# Patient Record
Sex: MALE | Race: WHITE | Employment: FULL TIME | URBAN - METROPOLITAN AREA
[De-identification: names, ages, dates, MRNs, and addresses within clinical notes are randomized per-mention and may not be internally consistent; named-entity substitution may affect disease eponyms.]

---

## 2020-06-29 DIAGNOSIS — Z11.59 ENCOUNTER FOR SCREENING FOR OTHER VIRAL DISEASES: Primary | ICD-10-CM

## 2020-07-08 DIAGNOSIS — Z11.59 ENCOUNTER FOR SCREENING FOR OTHER VIRAL DISEASES: ICD-10-CM

## 2020-07-08 PROCEDURE — U0003 INFECTIOUS AGENT DETECTION BY NUCLEIC ACID (DNA OR RNA); SEVERE ACUTE RESPIRATORY SYNDROME CORONAVIRUS 2 (SARS-COV-2) (CORONAVIRUS DISEASE [COVID-19]), AMPLIFIED PROBE TECHNIQUE, MAKING USE OF HIGH THROUGHPUT TECHNOLOGIES AS DESCRIBED BY CMS-2020-01-R: HCPCS | Performed by: ORTHOPAEDIC SURGERY

## 2020-07-08 PROCEDURE — 99207 ZZC NO BILLABLE SERVICE THIS VISIT: CPT

## 2020-07-09 ENCOUNTER — ANESTHESIA EVENT (OUTPATIENT)
Dept: SURGERY | Facility: CLINIC | Age: 35
End: 2020-07-09
Payer: COMMERCIAL

## 2020-07-09 LAB
SARS-COV-2 RNA SPEC QL NAA+PROBE: NOT DETECTED
SPECIMEN SOURCE: NORMAL

## 2020-07-09 RX ORDER — IBUPROFEN 200 MG
CAPSULE ORAL DAILY
COMMUNITY
End: 2020-07-09

## 2020-07-09 RX ORDER — FLUOXETINE 10 MG/1
10 TABLET, FILM COATED ORAL DAILY
COMMUNITY
End: 2020-07-09

## 2020-07-09 RX ORDER — FAMOTIDINE 20 MG/1
20 TABLET, FILM COATED ORAL AT BEDTIME
COMMUNITY
End: 2020-07-09

## 2020-07-10 ENCOUNTER — ANESTHESIA (OUTPATIENT)
Dept: SURGERY | Facility: CLINIC | Age: 35
End: 2020-07-10
Payer: COMMERCIAL

## 2020-07-10 ENCOUNTER — HOSPITAL ENCOUNTER (OUTPATIENT)
Facility: CLINIC | Age: 35
Discharge: HOME | End: 2020-07-11
Attending: ORTHOPAEDIC SURGERY | Admitting: ORTHOPAEDIC SURGERY
Payer: COMMERCIAL

## 2020-07-10 ENCOUNTER — APPOINTMENT (OUTPATIENT)
Dept: GENERAL RADIOLOGY | Facility: CLINIC | Age: 35
End: 2020-07-10
Attending: ORTHOPAEDIC SURGERY
Payer: COMMERCIAL

## 2020-07-10 DIAGNOSIS — M48.061 SPINAL STENOSIS OF LUMBAR REGION WITHOUT NEUROGENIC CLAUDICATION: ICD-10-CM

## 2020-07-10 DIAGNOSIS — M48.02 SPINAL STENOSIS OF CERVICAL REGION: Primary | ICD-10-CM

## 2020-07-10 DIAGNOSIS — M48.02 CERVICAL SPINAL STENOSIS: ICD-10-CM

## 2020-07-10 PROCEDURE — 25000125 ZZHC RX 250: Performed by: NURSE ANESTHETIST, CERTIFIED REGISTERED

## 2020-07-10 PROCEDURE — 25000128 H RX IP 250 OP 636: Performed by: NURSE ANESTHETIST, CERTIFIED REGISTERED

## 2020-07-10 PROCEDURE — 25000128 H RX IP 250 OP 636: Performed by: ORTHOPAEDIC SURGERY

## 2020-07-10 PROCEDURE — 71000012 ZZH RECOVERY PHASE 1 LEVEL 1 FIRST HR: Performed by: ORTHOPAEDIC SURGERY

## 2020-07-10 PROCEDURE — L8699 PROSTHETIC IMPLANT NOS: HCPCS | Performed by: ORTHOPAEDIC SURGERY

## 2020-07-10 PROCEDURE — 25800030 ZZH RX IP 258 OP 636: Performed by: NURSE ANESTHETIST, CERTIFIED REGISTERED

## 2020-07-10 PROCEDURE — 40000170 ZZH STATISTIC PRE-PROCEDURE ASSESSMENT II: Performed by: ORTHOPAEDIC SURGERY

## 2020-07-10 PROCEDURE — 37000009 ZZH ANESTHESIA TECHNICAL FEE, EACH ADDTL 15 MIN: Performed by: ORTHOPAEDIC SURGERY

## 2020-07-10 PROCEDURE — 25000301 ZZH OR RX SURGIFLO W/THROMBIN KIT 2ML 1991 OPNP: Performed by: ORTHOPAEDIC SURGERY

## 2020-07-10 PROCEDURE — 25000125 ZZHC RX 250: Performed by: ORTHOPAEDIC SURGERY

## 2020-07-10 PROCEDURE — 37000008 ZZH ANESTHESIA TECHNICAL FEE, 1ST 30 MIN: Performed by: ORTHOPAEDIC SURGERY

## 2020-07-10 PROCEDURE — 25000132 ZZH RX MED GY IP 250 OP 250 PS 637: Performed by: ANESTHESIOLOGY

## 2020-07-10 PROCEDURE — 27211022 ZZHC OR IOM SUPPLIES OPNP: Performed by: ORTHOPAEDIC SURGERY

## 2020-07-10 PROCEDURE — 25000566 ZZH SEVOFLURANE, EA 15 MIN: Performed by: ORTHOPAEDIC SURGERY

## 2020-07-10 PROCEDURE — 25000132 ZZH RX MED GY IP 250 OP 250 PS 637: Performed by: ORTHOPAEDIC SURGERY

## 2020-07-10 PROCEDURE — 36000069 ZZH SURGERY LEVEL 5 EA 15 ADDTL MIN: Performed by: ORTHOPAEDIC SURGERY

## 2020-07-10 PROCEDURE — 25800030 ZZH RX IP 258 OP 636: Performed by: ORTHOPAEDIC SURGERY

## 2020-07-10 PROCEDURE — 36000071 ZZH SURGERY LEVEL 5 W FLUORO 1ST 30 MIN: Performed by: ORTHOPAEDIC SURGERY

## 2020-07-10 PROCEDURE — 40000277 XR SURGERY CARM FLUORO LESS THAN 5 MIN W STILLS

## 2020-07-10 PROCEDURE — 27210794 ZZH OR GENERAL SUPPLY STERILE: Performed by: ORTHOPAEDIC SURGERY

## 2020-07-10 DEVICE — ARTIFICIAL CERVICAL DISC, 7LL (7MM H X 17MM W X 16MM D)
Type: IMPLANTABLE DEVICE | Site: SPINE CERVICAL | Status: FUNCTIONAL
Brand: M6-C

## 2020-07-10 RX ORDER — AMOXICILLIN 250 MG
1-2 CAPSULE ORAL 2 TIMES DAILY
Qty: 30 TABLET | Refills: 0 | Status: SHIPPED | OUTPATIENT
Start: 2020-07-10

## 2020-07-10 RX ORDER — DEXAMETHASONE SODIUM PHOSPHATE 4 MG/ML
4 INJECTION, SOLUTION INTRA-ARTICULAR; INTRALESIONAL; INTRAMUSCULAR; INTRAVENOUS; SOFT TISSUE EVERY 10 MIN PRN
Status: DISCONTINUED | OUTPATIENT
Start: 2020-07-10 | End: 2020-07-10 | Stop reason: HOSPADM

## 2020-07-10 RX ORDER — PROCHLORPERAZINE MALEATE 5 MG
10 TABLET ORAL EVERY 6 HOURS PRN
Status: DISCONTINUED | OUTPATIENT
Start: 2020-07-10 | End: 2020-07-11 | Stop reason: HOSPADM

## 2020-07-10 RX ORDER — OXYCODONE AND ACETAMINOPHEN 5; 325 MG/1; MG/1
1-2 TABLET ORAL EVERY 4 HOURS PRN
Qty: 40 TABLET | Refills: 0 | Status: SHIPPED | OUTPATIENT
Start: 2020-07-10

## 2020-07-10 RX ORDER — DEXAMETHASONE SODIUM PHOSPHATE 4 MG/ML
INJECTION, SOLUTION INTRA-ARTICULAR; INTRALESIONAL; INTRAMUSCULAR; INTRAVENOUS; SOFT TISSUE PRN
Status: DISCONTINUED | OUTPATIENT
Start: 2020-07-10 | End: 2020-07-10

## 2020-07-10 RX ORDER — ONDANSETRON 4 MG/1
4 TABLET, ORALLY DISINTEGRATING ORAL EVERY 30 MIN PRN
Status: DISCONTINUED | OUTPATIENT
Start: 2020-07-10 | End: 2020-07-10 | Stop reason: HOSPADM

## 2020-07-10 RX ORDER — SODIUM CHLORIDE 9 MG/ML
INJECTION, SOLUTION INTRAVENOUS CONTINUOUS
Status: DISCONTINUED | OUTPATIENT
Start: 2020-07-10 | End: 2020-07-11 | Stop reason: HOSPADM

## 2020-07-10 RX ORDER — FENTANYL CITRATE 50 UG/ML
INJECTION, SOLUTION INTRAMUSCULAR; INTRAVENOUS PRN
Status: DISCONTINUED | OUTPATIENT
Start: 2020-07-10 | End: 2020-07-10

## 2020-07-10 RX ORDER — EPHEDRINE SULFATE 50 MG/ML
INJECTION, SOLUTION INTRAMUSCULAR; INTRAVENOUS; SUBCUTANEOUS PRN
Status: DISCONTINUED | OUTPATIENT
Start: 2020-07-10 | End: 2020-07-10

## 2020-07-10 RX ORDER — HYDROMORPHONE HYDROCHLORIDE 1 MG/ML
.3-.5 INJECTION, SOLUTION INTRAMUSCULAR; INTRAVENOUS; SUBCUTANEOUS EVERY 10 MIN PRN
Status: DISCONTINUED | OUTPATIENT
Start: 2020-07-10 | End: 2020-07-10 | Stop reason: HOSPADM

## 2020-07-10 RX ORDER — ONDANSETRON 2 MG/ML
4 INJECTION INTRAMUSCULAR; INTRAVENOUS EVERY 30 MIN PRN
Status: DISCONTINUED | OUTPATIENT
Start: 2020-07-10 | End: 2020-07-10 | Stop reason: HOSPADM

## 2020-07-10 RX ORDER — ONDANSETRON 4 MG/1
4 TABLET, ORALLY DISINTEGRATING ORAL EVERY 6 HOURS PRN
Status: DISCONTINUED | OUTPATIENT
Start: 2020-07-10 | End: 2020-07-11 | Stop reason: HOSPADM

## 2020-07-10 RX ORDER — METOCLOPRAMIDE HYDROCHLORIDE 5 MG/ML
10 INJECTION INTRAMUSCULAR; INTRAVENOUS EVERY 6 HOURS PRN
Status: DISCONTINUED | OUTPATIENT
Start: 2020-07-10 | End: 2020-07-11 | Stop reason: HOSPADM

## 2020-07-10 RX ORDER — OXYCODONE AND ACETAMINOPHEN 5; 325 MG/1; MG/1
1-2 TABLET ORAL EVERY 4 HOURS PRN
Status: DISCONTINUED | OUTPATIENT
Start: 2020-07-10 | End: 2020-07-11 | Stop reason: HOSPADM

## 2020-07-10 RX ORDER — ONDANSETRON 2 MG/ML
INJECTION INTRAMUSCULAR; INTRAVENOUS PRN
Status: DISCONTINUED | OUTPATIENT
Start: 2020-07-10 | End: 2020-07-10

## 2020-07-10 RX ORDER — ACETAMINOPHEN 500 MG
1000 TABLET ORAL ONCE
Status: COMPLETED | OUTPATIENT
Start: 2020-07-10 | End: 2020-07-10

## 2020-07-10 RX ORDER — ONDANSETRON 2 MG/ML
4 INJECTION INTRAMUSCULAR; INTRAVENOUS EVERY 6 HOURS PRN
Status: DISCONTINUED | OUTPATIENT
Start: 2020-07-10 | End: 2020-07-11 | Stop reason: HOSPADM

## 2020-07-10 RX ORDER — CEFAZOLIN SODIUM 1 G/3ML
1 INJECTION, POWDER, FOR SOLUTION INTRAMUSCULAR; INTRAVENOUS SEE ADMIN INSTRUCTIONS
Status: DISCONTINUED | OUTPATIENT
Start: 2020-07-10 | End: 2020-07-10 | Stop reason: HOSPADM

## 2020-07-10 RX ORDER — SODIUM CHLORIDE, SODIUM LACTATE, POTASSIUM CHLORIDE, CALCIUM CHLORIDE 600; 310; 30; 20 MG/100ML; MG/100ML; MG/100ML; MG/100ML
INJECTION, SOLUTION INTRAVENOUS CONTINUOUS PRN
Status: DISCONTINUED | OUTPATIENT
Start: 2020-07-10 | End: 2020-07-10

## 2020-07-10 RX ORDER — CELECOXIB 200 MG/1
200 CAPSULE ORAL DAILY
Qty: 30 CAPSULE | Refills: 0 | Status: SHIPPED | OUTPATIENT
Start: 2020-07-10

## 2020-07-10 RX ORDER — ACETAMINOPHEN 325 MG/1
975 TABLET ORAL ONCE
Status: DISCONTINUED | OUTPATIENT
Start: 2020-07-10 | End: 2020-07-10 | Stop reason: HOSPADM

## 2020-07-10 RX ORDER — LIDOCAINE 40 MG/G
CREAM TOPICAL
Status: DISCONTINUED | OUTPATIENT
Start: 2020-07-10 | End: 2020-07-11 | Stop reason: HOSPADM

## 2020-07-10 RX ORDER — LIDOCAINE HYDROCHLORIDE 20 MG/ML
INJECTION, SOLUTION INFILTRATION; PERINEURAL PRN
Status: DISCONTINUED | OUTPATIENT
Start: 2020-07-10 | End: 2020-07-10

## 2020-07-10 RX ORDER — IBUPROFEN 600 MG/1
600 TABLET, FILM COATED ORAL EVERY 6 HOURS PRN
Status: DISCONTINUED | OUTPATIENT
Start: 2020-07-10 | End: 2020-07-11 | Stop reason: HOSPADM

## 2020-07-10 RX ORDER — CEFAZOLIN SODIUM 2 G/100ML
2 INJECTION, SOLUTION INTRAVENOUS
Status: COMPLETED | OUTPATIENT
Start: 2020-07-10 | End: 2020-07-10

## 2020-07-10 RX ORDER — DEXAMETHASONE SODIUM PHOSPHATE 4 MG/ML
6 INJECTION, SOLUTION INTRA-ARTICULAR; INTRALESIONAL; INTRAMUSCULAR; INTRAVENOUS; SOFT TISSUE EVERY 6 HOURS
Status: COMPLETED | OUTPATIENT
Start: 2020-07-10 | End: 2020-07-11

## 2020-07-10 RX ORDER — MAGNESIUM HYDROXIDE 1200 MG/15ML
LIQUID ORAL PRN
Status: DISCONTINUED | OUTPATIENT
Start: 2020-07-10 | End: 2020-07-10 | Stop reason: HOSPADM

## 2020-07-10 RX ORDER — DIMENHYDRINATE 50 MG/ML
12.5 INJECTION, SOLUTION INTRAMUSCULAR; INTRAVENOUS
Status: DISCONTINUED | OUTPATIENT
Start: 2020-07-10 | End: 2020-07-10 | Stop reason: HOSPADM

## 2020-07-10 RX ORDER — ALBUTEROL SULFATE 0.83 MG/ML
2.5 SOLUTION RESPIRATORY (INHALATION)
Status: DISCONTINUED | OUTPATIENT
Start: 2020-07-10 | End: 2020-07-10 | Stop reason: HOSPADM

## 2020-07-10 RX ORDER — METOCLOPRAMIDE 10 MG/1
10 TABLET ORAL EVERY 6 HOURS PRN
Status: DISCONTINUED | OUTPATIENT
Start: 2020-07-10 | End: 2020-07-11 | Stop reason: HOSPADM

## 2020-07-10 RX ORDER — NALOXONE HYDROCHLORIDE 0.4 MG/ML
.1-.4 INJECTION, SOLUTION INTRAMUSCULAR; INTRAVENOUS; SUBCUTANEOUS
Status: DISCONTINUED | OUTPATIENT
Start: 2020-07-10 | End: 2020-07-10 | Stop reason: HOSPADM

## 2020-07-10 RX ORDER — FENTANYL CITRATE 50 UG/ML
25-50 INJECTION, SOLUTION INTRAMUSCULAR; INTRAVENOUS EVERY 5 MIN PRN
Status: DISCONTINUED | OUTPATIENT
Start: 2020-07-10 | End: 2020-07-10 | Stop reason: HOSPADM

## 2020-07-10 RX ORDER — NALOXONE HYDROCHLORIDE 0.4 MG/ML
.1-.4 INJECTION, SOLUTION INTRAMUSCULAR; INTRAVENOUS; SUBCUTANEOUS
Status: DISCONTINUED | OUTPATIENT
Start: 2020-07-10 | End: 2020-07-11 | Stop reason: HOSPADM

## 2020-07-10 RX ORDER — GABAPENTIN 300 MG/1
300 CAPSULE ORAL
Status: COMPLETED | OUTPATIENT
Start: 2020-07-10 | End: 2020-07-10

## 2020-07-10 RX ORDER — SODIUM CHLORIDE, SODIUM LACTATE, POTASSIUM CHLORIDE, CALCIUM CHLORIDE 600; 310; 30; 20 MG/100ML; MG/100ML; MG/100ML; MG/100ML
INJECTION, SOLUTION INTRAVENOUS CONTINUOUS
Status: DISCONTINUED | OUTPATIENT
Start: 2020-07-10 | End: 2020-07-10 | Stop reason: HOSPADM

## 2020-07-10 RX ORDER — PROPOFOL 10 MG/ML
INJECTION, EMULSION INTRAVENOUS CONTINUOUS PRN
Status: DISCONTINUED | OUTPATIENT
Start: 2020-07-10 | End: 2020-07-10

## 2020-07-10 RX ORDER — MEPERIDINE HYDROCHLORIDE 25 MG/ML
12.5 INJECTION INTRAMUSCULAR; INTRAVENOUS; SUBCUTANEOUS
Status: DISCONTINUED | OUTPATIENT
Start: 2020-07-10 | End: 2020-07-10 | Stop reason: HOSPADM

## 2020-07-10 RX ORDER — PROPOFOL 10 MG/ML
INJECTION, EMULSION INTRAVENOUS PRN
Status: DISCONTINUED | OUTPATIENT
Start: 2020-07-10 | End: 2020-07-10

## 2020-07-10 RX ORDER — HYDROMORPHONE HYDROCHLORIDE 1 MG/ML
0.2 INJECTION, SOLUTION INTRAMUSCULAR; INTRAVENOUS; SUBCUTANEOUS
Status: DISCONTINUED | OUTPATIENT
Start: 2020-07-10 | End: 2020-07-11 | Stop reason: HOSPADM

## 2020-07-10 RX ADMIN — PROPOFOL 100 MCG/KG/MIN: 10 INJECTION, EMULSION INTRAVENOUS at 10:40

## 2020-07-10 RX ADMIN — ACETAMINOPHEN 1000 MG: 500 TABLET, FILM COATED ORAL at 09:10

## 2020-07-10 RX ADMIN — MIDAZOLAM 2 MG: 1 INJECTION INTRAMUSCULAR; INTRAVENOUS at 10:35

## 2020-07-10 RX ADMIN — PHENYLEPHRINE HYDROCHLORIDE 100 MCG: 10 INJECTION INTRAVENOUS at 11:39

## 2020-07-10 RX ADMIN — DEXAMETHASONE SODIUM PHOSPHATE 6 MG: 4 INJECTION, SOLUTION INTRAMUSCULAR; INTRAVENOUS at 16:32

## 2020-07-10 RX ADMIN — PROPOFOL 200 MG: 10 INJECTION, EMULSION INTRAVENOUS at 10:40

## 2020-07-10 RX ADMIN — FENTANYL CITRATE 100 MCG: 50 INJECTION, SOLUTION INTRAMUSCULAR; INTRAVENOUS at 10:40

## 2020-07-10 RX ADMIN — REMIFENTANIL HYDROCHLORIDE 0.1 MCG/KG/MIN: 1 INJECTION, POWDER, LYOPHILIZED, FOR SOLUTION INTRAVENOUS at 10:40

## 2020-07-10 RX ADMIN — ROCURONIUM BROMIDE 15 MG: 10 INJECTION INTRAVENOUS at 12:09

## 2020-07-10 RX ADMIN — DEXAMETHASONE SODIUM PHOSPHATE 4 MG: 4 INJECTION, SOLUTION INTRA-ARTICULAR; INTRALESIONAL; INTRAMUSCULAR; INTRAVENOUS; SOFT TISSUE at 10:40

## 2020-07-10 RX ADMIN — DEXAMETHASONE SODIUM PHOSPHATE 6 MG: 4 INJECTION, SOLUTION INTRAMUSCULAR; INTRAVENOUS at 23:31

## 2020-07-10 RX ADMIN — ROCURONIUM BROMIDE 40 MG: 10 INJECTION INTRAVENOUS at 10:40

## 2020-07-10 RX ADMIN — ROCURONIUM BROMIDE 10 MG: 10 INJECTION INTRAVENOUS at 11:13

## 2020-07-10 RX ADMIN — ROCURONIUM BROMIDE 10 MG: 10 INJECTION INTRAVENOUS at 10:56

## 2020-07-10 RX ADMIN — CEFAZOLIN SODIUM 2 G: 2 INJECTION, SOLUTION INTRAVENOUS at 10:52

## 2020-07-10 RX ADMIN — OXYCODONE HYDROCHLORIDE AND ACETAMINOPHEN 1 TABLET: 5; 325 TABLET ORAL at 21:46

## 2020-07-10 RX ADMIN — SUGAMMADEX 170 MG: 100 INJECTION, SOLUTION INTRAVENOUS at 12:44

## 2020-07-10 RX ADMIN — ROCURONIUM BROMIDE 10 MG: 10 INJECTION INTRAVENOUS at 12:29

## 2020-07-10 RX ADMIN — GABAPENTIN 300 MG: 300 CAPSULE ORAL at 09:10

## 2020-07-10 RX ADMIN — SODIUM CHLORIDE: 9 INJECTION, SOLUTION INTRAVENOUS at 17:41

## 2020-07-10 RX ADMIN — FENTANYL CITRATE 50 MCG: 50 INJECTION, SOLUTION INTRAMUSCULAR; INTRAVENOUS at 13:13

## 2020-07-10 RX ADMIN — Medication 10 MG: at 11:36

## 2020-07-10 RX ADMIN — ONDANSETRON 4 MG: 2 INJECTION INTRAMUSCULAR; INTRAVENOUS at 12:39

## 2020-07-10 RX ADMIN — ROCURONIUM BROMIDE 10 MG: 10 INJECTION INTRAVENOUS at 11:04

## 2020-07-10 RX ADMIN — Medication 5 MG: at 11:10

## 2020-07-10 RX ADMIN — SODIUM CHLORIDE, POTASSIUM CHLORIDE, SODIUM LACTATE AND CALCIUM CHLORIDE: 600; 310; 30; 20 INJECTION, SOLUTION INTRAVENOUS at 10:35

## 2020-07-10 RX ADMIN — SODIUM CHLORIDE, POTASSIUM CHLORIDE, SODIUM LACTATE AND CALCIUM CHLORIDE: 600; 310; 30; 20 INJECTION, SOLUTION INTRAVENOUS at 12:49

## 2020-07-10 ASSESSMENT — MIFFLIN-ST. JEOR: SCORE: 1893.81

## 2020-07-10 NOTE — ANESTHESIA PREPROCEDURE EVALUATION
"Anesthesia Pre-Procedure Evaluation    Patient: Keven Howell   MRN: [id number] : 1985          Preoperative Diagnosis: Cervicalgia [M54.2]    Procedure(s):  CERVICAL 6-7 TOTAL DISCECTOMY ARTHROPLASTY    History reviewed. No pertinent past medical history.  History reviewed. No pertinent surgical history.    Anesthesia Evaluation     .             ROS/MED HX    ENT/Pulmonary:  - neg pulmonary ROS    (-) sleep apnea   Neurologic:  - neg neurologic ROS     Cardiovascular:  - neg cardiovascular ROS       METS/Exercise Tolerance:     Hematologic:         Musculoskeletal:         GI/Hepatic:  - neg GI/hepatic ROS      (-) GERD   Renal/Genitourinary:  - ROS Renal section negative       Endo:  - neg endo ROS       Psychiatric:         Infectious Disease:         Malignancy:         Other:                          Physical Exam  Normal systems: dental    Airway   Mallampati: II  TM distance: >3 FB  Neck ROM: full    Dental     Cardiovascular   Rhythm and rate: regular      Pulmonary    breath sounds clear to auscultation            No results found for: WBC, HGB, HCT, PLT, CRP, SED, NA, POTASSIUM, CHLORIDE, CO2, BUN, CR, GLC, JES, PHOS, MAG, ALBUMIN, PROTTOTAL, ALT, AST, GGT, ALKPHOS, BILITOTAL, BILIDIRECT, LIPASE, AMYLASE, CORBY, PTT, INR, FIBR, TSH, T4, T3, HCG, HCGS, CKTOTAL, CKMB, TROPN    Preop Vitals  BP Readings from Last 3 Encounters:   07/10/20 124/50    Pulse Readings from Last 3 Encounters:   07/10/20 53      Resp Readings from Last 3 Encounters:   07/10/20 16    SpO2 Readings from Last 3 Encounters:   07/10/20 98%      Temp Readings from Last 1 Encounters:   No data found for Temp    Ht Readings from Last 1 Encounters:   07/10/20 1.905 m (6' 3\")      Wt Readings from Last 1 Encounters:   07/10/20 86.8 kg (191 lb 6.4 oz)    Estimated body mass index is 23.92 kg/m  as calculated from the following:    Height as of this encounter: 1.905 m (6' 3\").    Weight as of this encounter: 86.8 kg (191 lb 6.4 oz). "       Anesthesia Plan      History & Physical Review  History and physical reviewed and following examination; no interval change.    ASA Status:  1 .    NPO Status:  > 8 hours    Plan for General (ETT) with Intravenous and Propofol induction. Maintenance will be Balanced.    PONV prophylaxis:  Ondansetron (or other 5HT-3) and Dexamethasone or Solumedrol  Low dose propofol infusion for PONV prophylaxis (20-30 mcg/kg/min)          Postoperative Care  Postoperative pain management:  Multi-modal analgesia.      Consents  Anesthetic plan, risks, benefits and alternatives discussed with:  Patient..                 Keven Walsh MD

## 2020-07-10 NOTE — OR NURSING
Report called to Naomi ABREU. Awaiting sign out from anesthesiologist. Patient talking to his sister on phone.

## 2020-07-10 NOTE — ANESTHESIA CARE TRANSFER NOTE
Patient: Keven Howell    Procedure(s):  CERVICAL 6-7 TOTAL DISCECTOMY ARTHROPLASTY    Diagnosis: Cervicalgia [M54.2]  Diagnosis Additional Information: No value filed.    Anesthesia Type:   General     Note:  Airway :Face Mask  Patient transferred to:PACU  Comments: Neuromuscular blockade reversed after TOF 4/4, spontaneous respirations, adequate tidal volumes, followed commands to voice, oropharynx suctioned with soft flexible catheter, extubated atraumatically, airway patent after extubation.  Oxygen via facemask at 6 liters per minute to PACU. Oxygen tubing connected to wall O2 in PACU, SpO2, NiBP, and EKG monitors and alarms on and functioning, Rodrigo Hugger warmer connected to patient gown, report on patient's clinical status given to PACU RN, RN questions answered.   Handoff Report: Identifed the Patient, Identified the Reponsible Provider, Reviewed the pertinent medical history, Discussed the surgical course, Reviewed Intra-OP anesthesia mangement and issues during anesthesia, Set expectations for post-procedure period and Allowed opportunity for questions and acknowledgement of understanding      Vitals: (Last set prior to Anesthesia Care Transfer)    CRNA VITALS  7/10/2020 1237 - 7/10/2020 1315      7/10/2020             Pulse:  79    SpO2:  100 %    Resp Rate (observed):  (!) 7    Resp Rate (set):  10                Electronically Signed By: GOPI Haynes CRNA  July 10, 2020  1:15 PM

## 2020-07-10 NOTE — ANESTHESIA POSTPROCEDURE EVALUATION
Patient: Keven Howell    Procedure(s):  CERVICAL 6-7 TOTAL DISCECTOMY ARTHROPLASTY    Diagnosis:Cervicalgia [M54.2]  Diagnosis Additional Information: No value filed.    Anesthesia Type:  General    Note:  Anesthesia Post Evaluation    Patient location during evaluation: Bedside  Patient participation: Able to fully participate in evaluation  Level of consciousness: awake and alert  Pain management: adequate  Airway patency: patent  Cardiovascular status: acceptable  Respiratory status: acceptable  Hydration status: acceptable  PONV: none             Last vitals:  Vitals:    07/10/20 1430 07/10/20 1445 07/10/20 1500   BP: 136/85 138/80 (!) 141/75   Pulse: 58  51   Resp: 13 16 12   Temp:   36.6  C (97.9  F)   SpO2: 96% 97% 97%         Electronically Signed By: Keven Walsh MD  July 10, 2020  3:18 PM

## 2020-07-11 VITALS
BODY MASS INDEX: 23.8 KG/M2 | SYSTOLIC BLOOD PRESSURE: 121 MMHG | DIASTOLIC BLOOD PRESSURE: 63 MMHG | HEIGHT: 75 IN | WEIGHT: 191.4 LBS | TEMPERATURE: 97.7 F | OXYGEN SATURATION: 98 % | RESPIRATION RATE: 16 BRPM | HEART RATE: 51 BPM

## 2020-07-11 LAB — GLUCOSE BLDC GLUCOMTR-MCNC: 137 MG/DL (ref 70–99)

## 2020-07-11 PROCEDURE — 25000128 H RX IP 250 OP 636: Performed by: ORTHOPAEDIC SURGERY

## 2020-07-11 PROCEDURE — 82962 GLUCOSE BLOOD TEST: CPT

## 2020-07-11 PROCEDURE — 25000132 ZZH RX MED GY IP 250 OP 250 PS 637: Performed by: ORTHOPAEDIC SURGERY

## 2020-07-11 RX ADMIN — OXYCODONE HYDROCHLORIDE AND ACETAMINOPHEN 2 TABLET: 5; 325 TABLET ORAL at 01:49

## 2020-07-11 RX ADMIN — OXYCODONE HYDROCHLORIDE AND ACETAMINOPHEN 2 TABLET: 5; 325 TABLET ORAL at 05:57

## 2020-07-11 RX ADMIN — DEXAMETHASONE SODIUM PHOSPHATE 6 MG: 4 INJECTION, SOLUTION INTRAMUSCULAR; INTRAVENOUS at 05:57

## 2020-07-11 NOTE — PLAN OF CARE
POD 1 C6-7.  A&O x 4. CMS - moving all extremities spontaneously, denying any decreased sensation, generalized weakness/no more leg weakness noted.  Positive flatus, tolerating regular diet. VSS. Dressing dressed/jj drain dc'd/sterry strips intact/small dried drainage noted. Up independently, no complaints of dizziness/no shortness of breath. C/o neck discomfort/decreased with percocet & rest. Pt scoring green on the Aggression Stop Light Tool. Discharged home via sister transport approximately 1000. Prescriptions provided/agreed with discharge plan, no unmet need upon discharge. AVS reviewed/questions answered/pam discharge instruction sheet provided.

## 2020-07-11 NOTE — PLAN OF CARE
POD 1 from a anterior cervical lami. A&Ox4. CMS intact ex LLE slightly weaker then R. Bowel sounds active, - flatus, tolerating Regular diet. VSS. Dressing CDI. Hemovac/TEQUILA patent. Up with SBA. Pain managed with percocet. Pt scoring green on the Aggression Stop Light Tool. Plan  to discharge today.

## 2020-07-11 NOTE — PROGRESS NOTES
Phoned CORNELIUS Juares re: discharge orders.  Left message regarding, will discharge patient as soon as orders received.  Drain is out per dr. Duran verbal order at bedside/incision redressed withgauze/tape. Prescriptions filled and ready at nurses station.  Will await direction.

## 2020-07-11 NOTE — PLAN OF CARE
POD 0 from a anterior cervical lami. A&Ox4. CMS intact ex LLE slightly weaker then R. Bowel sounds active, - flatus, tolerating Regular diet. VSS. Dressing CDI. Hemovac/TEQUILA patent. Up with SBA. C/o no pain. Pt scoring green on the Aggression Stop Light Tool. Plan discharge tomorrow.

## 2020-07-30 NOTE — DISCHARGE SUMMARY
Physician Discharge Summary     Patient ID:  Keven Howell  [id number]  35 year old  1985    Admit date: 7/10/2020    Discharge date and time: 7/11/2020 10:02 AM     Admitting Physician: Asad Rutherford MD     Discharge Physician: Same    Admission Diagnoses: Cervical stenosis C6-7    Discharge Diagnoses: Same    Procedure: C6-7 total disc replacement with use of M6 total disc     Hospital Course: On the day of admission the patient underwent the above procedure. He tolerated the procedure without difficulty.   The patient made good progress in eating, voiding and ambulating. His drain was discontinued on POD #1.     Consults: Internal Medicine    Discharge Exam:  Patient had a normal neurologic exam in his UE and LE     Disposition: Home     Patient Instructions:   Discharge Medication List as of 7/11/2020  9:30 AM      START taking these medications    Details   celecoxib (CELEBREX) 200 MG capsule Take 1 capsule (200 mg) by mouth daily, Disp-30 capsule,R-0, E-Prescribe      oxyCODONE-acetaminophen (PERCOCET) 5-325 MG tablet Take 1-2 tablets by mouth every 4 hours as needed for pain (moderate to severe), Disp-40 tablet,R-0, Local Print      senna-docusate (SENOKOT-S/PERICOLACE) 8.6-50 MG tablet Take 1-2 tablets by mouth 2 times daily Take while on oral narcotics to prevent or treat constipation., Disp-30 tablet,R-0, E-PrescribeWhile on narcotics           Wear soft collar for 2 weeks   Avoid lifting more than 10 lbs for 4 weeks  Follow up in 3-4 weeks  Resume soft diet    Signed:  Mor Pérez PA-C  7/30/2020  12:10 PM

## 2020-08-03 NOTE — OP NOTE
Procedure Date: 07/10/2020      PREOPERATIVE DIAGNOSIS:  C6-7 disc herniation with left C7 radiculopathy.      POSTOPERATIVE DIAGNOSIS:  C6-7 disc herniation with left C7 radiculopathy.      PROCEDURE:     1.  C6-7 total disc arthroplasty.   2.  Use of operating microscope for microsurgical techniques.      SURGEON:  Asad Rutherford MD      ASSISTANT:  Mor Pérez PA-C      ANESTHESIA:  General.      ESTIMATED BLOOD LOSS:  25 mL      INDICATIONS:  The patient for treatment of cervical radiculopathy from disc herniation at C6-7.  Risks and benefits discussed, heart attack, stroke, blood clot, wound infection, chronic neck pain, chronic arm pain, permanent pain, permanent weakness, permanent paraplegia, permanent quadriplegia, permanent dysphagia, permanent dysphonia, need for revision surgeries.  FDA ID results on disc arthroplasty was reviewed.  Informed consent was obtained.      DESCRIPTION OF PROCEDURE:  The patient was brought to the operating room, intubated by Anesthesia,  positioned supine on the operating table.  All extremities were padded and secured.  Anterior cervical spine prepped and draped sterilely.  Timeout was performed.  A 30 mm transverse incision made at the C6-7 level anteriorly, dissection carried through platysma and then following the pretracheal and prevertebral fascia, reached the anterior cervical spine.  Localizing marker placed.  Level confirmed with lateral imaging.  Longus colli dissected subperiosteally.   retractor and Murphy retractors placed at C6-7.  Operative microscope brought in.  Complete diskectomy performed at C6-7 with pituitaries and curettes.  Posterior longitudinal ligament identified.  There was a rent in the longitudinal ligament on the left hand side, consistent with disc herniation.  I resected the posterior longitudinal ligament with a 2 mm Kerrison to identify the dura.  I found free fragments of disc in the lateral recess and around the neural  foramen at C6-7 on the left which I was removed with a micropituitary.  Foraminotomies performed over the C7 nerve root with a 2 mm Kerrison.  Following decompression, ball-tip probe passed over the C7 nerve roots bilaterally.  I then used the Orthofix M6 trial and cutting jig to make a trough for my disc arthroplasty.  Then, under fluoroscopic assistance, I placed the Orthofix M6 disc arthroplasty in the appropriate position on AP and lateral images.  The prosthesis was stable through physiologic range of motion testing.  Final images showed good prosthesis position.        The wound was irrigated.  Hemostasis achieved.  Deep drain placed exiting through the wound closed in layers.  Sterile dressing applied.  The patient was extubated, transferred recovery room stable.         HERLINDA KRISHNA MD             D: 2020   T: 2020   MT: ALYSSA      Name:     LOIS HERRERA   MRN:      [id number]        Account:        [id number]   :      1985           Procedure Date: 07/10/2020      Document: [id number]

## 2025-01-27 ENCOUNTER — OFFICE VISIT (OUTPATIENT)
Dept: PHYSICAL MEDICINE AND REHAB | Facility: CLINIC | Age: 40
End: 2025-01-27
Payer: COMMERCIAL

## 2025-01-27 VITALS
HEART RATE: 75 BPM | DIASTOLIC BLOOD PRESSURE: 78 MMHG | OXYGEN SATURATION: 96 % | SYSTOLIC BLOOD PRESSURE: 138 MMHG | RESPIRATION RATE: 16 BRPM

## 2025-01-27 DIAGNOSIS — M48.02 CERVICAL SPINAL STENOSIS: Primary | ICD-10-CM

## 2025-01-27 PROCEDURE — 99204 OFFICE O/P NEW MOD 45 MIN: CPT | Performed by: STUDENT IN AN ORGANIZED HEALTH CARE EDUCATION/TRAINING PROGRAM

## 2025-01-27 NOTE — PROGRESS NOTES
PM&R Clinic Note     Patient Name: Keven Howell : 1985 Medical Record: [id number]          History of Present Illness:     Keven Howell is a 39 year old male with a PMH of C6-C7 discectomy in 2020.  Prior to his discectomy, he had tingling and numbness in his right leg followed by weakness in his left leg from 2020.  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  8 months after the surgery, he continued to have weakness in the left leg and aching in the right leg.  He also noted clumsiness with his left hand during activities like shuffling cards.    FUNCTION  He endorses muscle weakness on the left side of his body.  He went to physical therapy a couple times and kept going to the gym.  He was given exercises to help activate the muscles on his left side.  He feels like he is benefiting from exercise and feels like he might get stronger than even before the injury.  He is walking independently without assistance, and he can run but very carefully. He feels like his hip flexors are still weak.  He denies any falls. When he does stumble, he is able to correct himself. He is driving. He is independent with all other ADLs.  He is a . His ability to do work has not been affected by his spinal cord injury.  He still isn't lifting weights more than 10 lbs.   His goal is a full recovery to baseline independent level of function.  He feels like he is constantly moving due to hyper-awareness of his muscles. This has been distracting, but the movement has been beneficial from a physical recovery standpoint.    DME  No equipment.    BOWEL  He has intermittent irregular bowel movements - he alternates between constipation and diarrhea.  However, this has only minimally affected his function. His bowel movements have normalized since then. He denies incontinence.    BLADDER  Urinating volitionally regularly, but sometimes does need to focus prior to  urinating. No incontinence.  Urinates 10 to 20 times a day.  At night, urinates maybe once a night.    SPASTICITY  No major spasticity.    PAIN/MSK  No signs of neuropathic pain.    SEXUALITY  Sexually active, has improved feeling on the left side.  Used to get clonus with ejaculation but not anymore.    AUTONOMIC DYSREFLEXIA  His blood pressure is often read at 130-140s but never goes above 140s.    ORTHOSTATIC HYPOTENSION  Denies.    CARDIOMETABOLIC  Weight has been stable.         Past Medical and Surgical History:     No past medical history on file.  Past Surgical History:   Procedure Laterality Date    REPLACE DISK CERVICAL ANTERIOR N/A 7/10/2020    Procedure: CERVICAL 6-7 TOTAL DISCECTOMY ARTHROPLASTY;  Surgeon: Asad Rutherford MD;  Location:  OR            Social History:     Marital status:   Care attendants: Independent  Housing: Lives in a house  Tobacco: He reports that he has never smoked. He has never used smokeless tobacco.  Alcohol: He reports current alcohol use.  Recreational Drugs: He reports no history of drug use.  Occupation:   Hobbies: Used to be tennis         Family History:     No family history on file.         Medications:     Current Outpatient Medications   Medication Sig Dispense Refill    celecoxib (CELEBREX) 200 MG capsule Take 1 capsule (200 mg) by mouth daily 30 capsule 0    oxyCODONE-acetaminophen (PERCOCET) 5-325 MG tablet Take 1-2 tablets by mouth every 4 hours as needed for pain (moderate to severe) 40 tablet 0    senna-docusate (SENOKOT-S/PERICOLACE) 8.6-50 MG tablet Take 1-2 tablets by mouth 2 times daily Take while on oral narcotics to prevent or treat constipation. 30 tablet 0            Allergies:     No Known Allergies         Immunizations     Most Recent Immunizations   Administered Date(s) Administered    COVID-19 MONOVALENT 12+ ([employer]) 11/17/2021            Laboratory/Imaging:     No lab results found.  No lab results found.  No lab  results found.  No lab results found.  No lab results found.         Physical Examination:     VITAL SIGNS: There were no vitals taken for this visit.  Wt Readings from Last 4 Encounters:   07/10/20 86.8 kg (191 lb 6.4 oz)     Gen: NAD, pleasant and cooperative   Cardio: regular pulse  Pulm: non-labored breathing in room air  Abd: benign  Ext: WWP, no edema in BLE, no tenderness in calves  MSK  Full nontender range of motion of the upper extremity  Neuro:   Strength  Upper Ext Right Left   Biceps 5 4   Wrist Ex 5 5   Triceps 5 5   DIP Flex 5 5   ADM 5 4     Lower Ext Right Left   Hip Flex 5 5   Knee Ext 5 4   ADF 5 4   EHL - -   APF 5 5     Reflexes   Right Left   Biceps 2 2   Brach 2 2   Triceps 2 2   Patellar 2 3   Achilles 2 3      Spasticity  Upper Ext Right Left   Biceps 0 0   Wrist Flex 0 0   Triceps 0 0   DIP Flex 0 0     Lower Ext Right Left   Hip Adductor 0 0   Quadriceps 0 0   Hamstrings 0 1   Gastroc 0 0   Several beats of clonus of left ankle  Gait: narrow, good sarah, non-antalgic, symmetric, good heel strike and clearance with each step.         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    # ADL & mobility deficits due to left hemiplegia  Years after his nontraumatic injury, he has residual neurologic deficits on exam that do not translate into obvious mobility or ADL deficits. They do not limit his performance at work or iADLs.  - No concern for neurologic decline at this time  - no obvious targets for directed therapy  # Spasticity  Has spasms in his left lower extremity but not bothersome, painful, or interfering with function at this time    # Neurogenic Bladder  Frequent urination but no excessive nighttime voiding. Low risk of VUR.  - continue to monitor    # Neurogenic Bowel  Some variability of schedule but well adapted  - continue to monitor    # Risk for adjustment disorder r/t SCI  Patient  well-adjusted to injury and deficits    # Neurogenic sexual dysfunction  No functional deficit in sexual function.    # Risk for autonomic dysreflexia (AD)   No concern for autonomic dysreflexia at this time    # Risk for accelerated cardiometabolic disease due to left hemiplegia  Weight stable and has good body composition.  - can liberalize and increase strength training  - encouraged graduated participation in activities like tennis  - mild falls precautions    # Follow up:  - as needed if elevated functional activity encounters barriers    Roger Morales MD  Physical Medicine & Rehabilitation    I spent a total of 50 minutes on the date of service doing chart review, history and exam, documentation, and further activities as noted above.

## 2025-01-27 NOTE — LETTER
2025       RE: Keven Howell  [address] [facility] [address]     Dear Colleague,    Thank you for referring your patient, Keven Howell, to the [facility] PHYSICAL MEDICINE AND REHABILITATION CLINIC [place] at [facility]. Please see a copy of my visit note below.         PM&R Clinic Note     Patient Name: Keven Howell : 1985 Medical Record: [id number]          History of Present Illness:     Keven Howell is a 39 year old male with a PMH of C6-C7 discectomy in 2020.  Prior to his discectomy, he had tingling and numbness in his right leg followed by weakness in his left leg from 2020.  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  8 months after the surgery, he continued to have weakness in the left leg and aching in the right leg.  He also noted clumsiness with his left hand during activities like shuffling cards.    FUNCTION  He endorses muscle weakness on the left side of his body.  He went to physical therapy a couple times and kept going to the gym.  He was given exercises to help activate the muscles on his left side.  He feels like he is benefiting from exercise and feels like he might get stronger than even before the injury.  He is walking independently without assistance, and he can run but very carefully. He feels like his hip flexors are still weak.  He denies any falls. When he does stumble, he is able to correct himself. He is driving. He is independent with all other ADLs.  He is a . His ability to do work has not been affected by his spinal cord injury.  He still isn't lifting weights more than 10 lbs.   His goal is a full recovery to baseline independent level of function.  He feels like he is constantly moving due to hyper-awareness of his muscles. This has been distracting, but the movement has been beneficial from a physical recovery  standpoint.    DME  No equipment.    BOWEL  He has intermittent irregular bowel movements - he alternates between constipation and diarrhea.  However, this has only minimally affected his function. His bowel movements have normalized since then. He denies incontinence.    BLADDER  Urinating volitionally regularly, but sometimes does need to focus prior to urinating. No incontinence.  Urinates 10 to 20 times a day.  At night, urinates maybe once a night.    SPASTICITY  No major spasticity.    PAIN/MSK  No signs of neuropathic pain.    SEXUALITY  Sexually active, has improved feeling on the left side.  Used to get clonus with ejaculation but not anymore.    AUTONOMIC DYSREFLEXIA  His blood pressure is often read at 130-140s but never goes above 140s.    ORTHOSTATIC HYPOTENSION  Denies.    CARDIOMETABOLIC  Weight has been stable.         Past Medical and Surgical History:     No past medical history on file.  Past Surgical History:   Procedure Laterality Date     REPLACE DISK CERVICAL ANTERIOR N/A 7/10/2020    Procedure: CERVICAL 6-7 TOTAL DISCECTOMY ARTHROPLASTY;  Surgeon: Asad Rutherford MD;  Location:  OR            Social History:     Marital status:   Care attendants: Independent  Housing: Lives in a house  Tobacco: He reports that he has never smoked. He has never used smokeless tobacco.  Alcohol: He reports current alcohol use.  Recreational Drugs: He reports no history of drug use.  Occupation:   Hobbies: Used to be tennis         Family History:     No family history on file.         Medications:     Current Outpatient Medications   Medication Sig Dispense Refill     celecoxib (CELEBREX) 200 MG capsule Take 1 capsule (200 mg) by mouth daily 30 capsule 0     oxyCODONE-acetaminophen (PERCOCET) 5-325 MG tablet Take 1-2 tablets by mouth every 4 hours as needed for pain (moderate to severe) 40 tablet 0     senna-docusate (SENOKOT-S/PERICOLACE) 8.6-50 MG tablet Take 1-2 tablets by  mouth 2 times daily Take while on oral narcotics to prevent or treat constipation. 30 tablet 0            Allergies:     No Known Allergies         Immunizations     Most Recent Immunizations   Administered Date(s) Administered     COVID-19 MONOVALENT 12+ (Pfizer) 11/17/2021            Laboratory/Imaging:     No lab results found.  No lab results found.  No lab results found.  No lab results found.  No lab results found.         Physical Examination:     VITAL SIGNS: There were no vitals taken for this visit.  Wt Readings from Last 4 Encounters:   07/10/20 86.8 kg (191 lb 6.4 oz)     Gen: NAD, pleasant and cooperative   Cardio: regular pulse  Pulm: non-labored breathing in room air  Abd: benign  Ext: WWP, no edema in BLE, no tenderness in calves  MSK  Full nontender range of motion of the upper extremity  Neuro:   Strength  Upper Ext Right Left   Biceps 5 4   Wrist Ex 5 5   Triceps 5 5   DIP Flex 5 5   ADM 5 4     Lower Ext Right Left   Hip Flex 5 5   Knee Ext 5 4   ADF 5 4   EHL - -   APF 5 5     Reflexes   Right Left   Biceps 2 2   Brach 2 2   Triceps 2 2   Patellar 2 3   Achilles 2 3      Spasticity  Upper Ext Right Left   Biceps 0 0   Wrist Flex 0 0   Triceps 0 0   DIP Flex 0 0     Lower Ext Right Left   Hip Adductor 0 0   Quadriceps 0 0   Hamstrings 0 1   Gastroc 0 0   Several beats of clonus of left ankle  Gait: narrow, good sarah, non-antalgic, symmetric, good heel strike and clearance with each step.         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    # ADL & mobility deficits due to left hemiplegia  Years after his nontraumatic injury, he has residual neurologic deficits on exam that do not translate into obvious mobility or ADL deficits. They do not limit his performance at work or iADLs.  - No concern for neurologic decline at this time  - no obvious targets for directed therapy  # Spasticity  Has spasms  in his left lower extremity but not bothersome, painful, or interfering with function at this time    # Neurogenic Bladder  Frequent urination but no excessive nighttime voiding. Low risk of VUR.  - continue to monitor    # Neurogenic Bowel  Some variability of schedule but well adapted  - continue to monitor    # Risk for adjustment disorder r/t SCI  Patient well-adjusted to injury and deficits    # Neurogenic sexual dysfunction  No functional deficit in sexual function.    # Risk for autonomic dysreflexia (AD)   No concern for autonomic dysreflexia at this time    # Risk for accelerated cardiometabolic disease due to left hemiplegia  Weight stable and has good body composition.  - can liberalize and increase strength training  - encouraged graduated participation in activities like tennis  - mild falls precautions    # Follow up:  - as needed if elevated functional activity encounters barriers    Roger Morales MD  Physical Medicine & Rehabilitation    I spent a total of 50 minutes on the date of service doing chart review, history and exam, documentation, and further activities as noted above.      Again, thank you for allowing me to participate in the care of your patient.      Sincerely,    Roger Morales MD

## 2025-01-27 NOTE — PATIENT INSTRUCTIONS
You have made substantial recovery after your spinal cord injury.  I believe that further strength training with minimal restrictions (no excessive load bearing in the cervical spine) and return to a high level of functional and recreational activity will help accelerate your improvement in function.  There are also research studies, like cognitive multisensory rehabilitation, that may be interesting therapy avenues to participate.      Spinal Cord Injury Research Resources    scitrials.org  This is a national list of studies taking information from clinicaltrials.gov that is focused on making searching for SCI-specific trials easier.  Not every local trial will be on this website, but all large, nationally funded trials are obligated to be publicly listed here.  You will need to know your AIS Classification prior to participating in most of these trials.    Potential Trial: Cognitive MultiSensory Rehabilitation. https://scitrials.org/trial/QON90710991    [email or web address]  The SCI registry has been created by the team at the [facility] to help notify individuals living with spinal cord injuries (SCI) about research opportunities, updates, and events in the [place] region at [facility]. To learn more and sign up for this registry, go to the above address and click the link to the research registry. The consent and registration form will take approximately 10-15 minutes to complete, and participation is completely voluntary and does not require you to live locally to the [place].

## (undated) DEVICE — PREP DURAPREP 06ML APL 8635

## (undated) DEVICE — PACK SPINE SM CUSTOM SNE15SSFSK

## (undated) DEVICE — DRAIN JACKSON PRATT 10FR ROUND SU130-1321

## (undated) DEVICE — SU NUROLON 4-0 TF CR 8X18" C584D

## (undated) DEVICE — SU SILK 3-0 TIE 12X30" A304H

## (undated) DEVICE — SU VICRYL 4-0 RB-1 UND CR/8 18" J714D

## (undated) DEVICE — DRAPE MICROSCOPE EQUIP 48X120" 6130VL2

## (undated) DEVICE — TAPE DRSG UNIVERSAL CLOTH 3" WHITE LATEX 881-3

## (undated) DEVICE — GLOVE PROTEXIS W/NEU-THERA 7.5  2D73TE75

## (undated) DEVICE — SU VICRYL 3-0 SH CR 8X18" J774

## (undated) DEVICE — SU MONOCRYL 4-0 PS-2 18" UND Y496G

## (undated) DEVICE — DRAIN JACKSON PRATT RESERVOIR 100ML SU130-1305

## (undated) DEVICE — ESU GROUND PAD UNIVERSAL W/O CORD

## (undated) DEVICE — SPONGE RAY-TEC 4X8" 7318

## (undated) DEVICE — GLOVE PROTEXIS W/NEU-THERA 7.0  2D73TE70

## (undated) DEVICE — MIDAS REX DISSECTING TOOL 5.5MM T14MH25

## (undated) DEVICE — LINEN TOWEL PACK X5 5464

## (undated) DEVICE — PIN DISTRACTION ANCHOR FOR SCR 14MM MDS9091414

## (undated) DEVICE — NDL SPINAL 18GA 3.5" 405184

## (undated) DEVICE — RX SURGIFLO HEMOSTATIC MATRIX W/THROMBIN 8ML 2994

## (undated) DEVICE — SU VICRYL 0 UR-6 27" J603H

## (undated) DEVICE — DRAPE SHEET REV FOLD 3/4 9349

## (undated) DEVICE — ESU ELEC BLADE 2.75" COATED/INSULATED E1455

## (undated) DEVICE — GLOVE PROTEXIS BLUE W/NEU-THERA 8.0  2D73EB80

## (undated) DEVICE — SPONGE KITTNER 30-101

## (undated) DEVICE — DRAIN JACKSON PRATT 07FR ROUND SU130-1320

## (undated) DEVICE — DRAPE MAYO STAND 23X54 8337

## (undated) DEVICE — GLOVE PROTEXIS BLUE W/NEU-THERA 7.0  2D73EB70

## (undated) DEVICE — BLADE KNIFE SURG 15 371115

## (undated) DEVICE — SU VICRYL 4-0 PS-2 18" UND J496H

## (undated) DEVICE — SUCTION MANIFOLD NEPTUNE SGL

## (undated) DEVICE — SOL WATER IRRIG 1000ML BOTTLE 2F7114

## (undated) DEVICE — GLOVE PROTEXIS MICRO 7.0  2D73PM70

## (undated) DEVICE — IONM UP TO 7 HOURS

## (undated) DEVICE — SPONGE SURGIFOAM 12 1972

## (undated) RX ORDER — REMIFENTANIL HYDROCHLORIDE 1 MG/ML
INJECTION, POWDER, LYOPHILIZED, FOR SOLUTION INTRAVENOUS
Status: DISPENSED
Start: 2020-07-10

## (undated) RX ORDER — PROPOFOL 10 MG/ML
INJECTION, EMULSION INTRAVENOUS
Status: DISPENSED
Start: 2020-07-10

## (undated) RX ORDER — CEFAZOLIN SODIUM 2 G/100ML
INJECTION, SOLUTION INTRAVENOUS
Status: DISPENSED
Start: 2020-07-10

## (undated) RX ORDER — GLYCOPYRROLATE 0.2 MG/ML
INJECTION, SOLUTION INTRAMUSCULAR; INTRAVENOUS
Status: DISPENSED
Start: 2020-07-10

## (undated) RX ORDER — DEXAMETHASONE SODIUM PHOSPHATE 4 MG/ML
INJECTION, SOLUTION INTRA-ARTICULAR; INTRALESIONAL; INTRAMUSCULAR; INTRAVENOUS; SOFT TISSUE
Status: DISPENSED
Start: 2020-07-10

## (undated) RX ORDER — FENTANYL CITRATE 50 UG/ML
INJECTION, SOLUTION INTRAMUSCULAR; INTRAVENOUS
Status: DISPENSED
Start: 2020-07-10

## (undated) RX ORDER — BUPIVACAINE HYDROCHLORIDE 5 MG/ML
INJECTION, SOLUTION EPIDURAL; INTRACAUDAL
Status: DISPENSED
Start: 2020-07-10

## (undated) RX ORDER — ACETAMINOPHEN 500 MG
TABLET ORAL
Status: DISPENSED
Start: 2020-07-10

## (undated) RX ORDER — LIDOCAINE HYDROCHLORIDE 20 MG/ML
INJECTION, SOLUTION EPIDURAL; INFILTRATION; INTRACAUDAL; PERINEURAL
Status: DISPENSED
Start: 2020-07-10

## (undated) RX ORDER — GABAPENTIN 300 MG/1
CAPSULE ORAL
Status: DISPENSED
Start: 2020-07-10

## (undated) RX ORDER — NEOSTIGMINE METHYLSULFATE 1 MG/ML
VIAL (ML) INJECTION
Status: DISPENSED
Start: 2020-07-10

## (undated) RX ORDER — ONDANSETRON 2 MG/ML
INJECTION INTRAMUSCULAR; INTRAVENOUS
Status: DISPENSED
Start: 2020-07-10